# Patient Record
Sex: FEMALE | Race: WHITE | NOT HISPANIC OR LATINO | URBAN - METROPOLITAN AREA
[De-identification: names, ages, dates, MRNs, and addresses within clinical notes are randomized per-mention and may not be internally consistent; named-entity substitution may affect disease eponyms.]

---

## 2020-08-06 ENCOUNTER — LAB OUTSIDE AN ENCOUNTER (OUTPATIENT)
Dept: URBAN - METROPOLITAN AREA CLINIC 70 | Facility: CLINIC | Age: 50
End: 2020-08-06

## 2020-08-06 ENCOUNTER — OFFICE VISIT (OUTPATIENT)
Dept: URBAN - METROPOLITAN AREA CLINIC 70 | Facility: CLINIC | Age: 50
End: 2020-08-06
Payer: COMMERCIAL

## 2020-08-06 ENCOUNTER — WEB ENCOUNTER (OUTPATIENT)
Dept: URBAN - METROPOLITAN AREA CLINIC 70 | Facility: CLINIC | Age: 50
End: 2020-08-06

## 2020-08-06 DIAGNOSIS — R15.2 FECAL URGENCY: ICD-10-CM

## 2020-08-06 DIAGNOSIS — R11.0 NAUSEA: ICD-10-CM

## 2020-08-06 DIAGNOSIS — K21.0 REFLUX ESOPHAGITIS: ICD-10-CM

## 2020-08-06 DIAGNOSIS — R19.7 DIARRHEA, UNSPECIFIED TYPE: ICD-10-CM

## 2020-08-06 PROCEDURE — 99204 OFFICE O/P NEW MOD 45 MIN: CPT | Performed by: NURSE PRACTITIONER

## 2020-08-06 PROCEDURE — 1036F TOBACCO NON-USER: CPT | Performed by: NURSE PRACTITIONER

## 2020-08-06 PROCEDURE — G8417 CALC BMI ABV UP PARAM F/U: HCPCS | Performed by: NURSE PRACTITIONER

## 2020-08-06 PROCEDURE — G8427 DOCREV CUR MEDS BY ELIG CLIN: HCPCS | Performed by: NURSE PRACTITIONER

## 2020-08-06 PROCEDURE — 87507 IADNA-DNA/RNA PROBE TQ 12-25: CPT | Performed by: NURSE PRACTITIONER

## 2020-08-06 RX ORDER — VILAZODONE HYDROCHLORIDE 20 MG/1
TAKE 2 TABLETS (40 MG) BY ORAL ROUTE ONCE DAILY WITH FOOD TABLET ORAL 1
Qty: 0 | Refills: 0 | Status: DISCONTINUED | COMMUNITY
Start: 1900-01-01

## 2020-08-06 RX ORDER — PANTOPRAZOLE SODIUM 40 MG/1
1 TABLET TABLET, DELAYED RELEASE ORAL
Qty: 90 | Refills: 1 | OUTPATIENT

## 2020-08-06 RX ORDER — COLESEVELAM HYDROCHLORIDE 625 MG/1
TAKE 1 TABLET BY ORAL ROUTE 2 TIMES A DAY AS NEEDED TABLET, FILM COATED ORAL 2
Qty: 60 | Refills: 11 | Status: DISCONTINUED | COMMUNITY
Start: 2016-02-17

## 2020-08-06 NOTE — HPI-TODAY'S VISIT:
Currently c/o increased diarrhea over the last 3 months.  Also reports nausea x 1 year (intermittently).  Stools are loose to watery with urgency.  Denies signs of GI blood loss, abx within last 6 weeks, nocturnal diarrhea or nocturnal diarrhea.  Defecation occurs up 5 times a day but she does feel emptied of stool.  She underwent colonoscopy 09/2013 with 2 nonspecific ileal ulcers noted (benign).  Random bx were obtained due to c/o diarrhea at that time with negative results.   If she misses a dose of prilosec voices onset of sharp, digging pain to epigastric pain.  Denies NSAIDs or breakthrough symptoms of GERD.  EGD performed in 2014:  reflux esophagitis and gastritis (neg HP).

## 2020-08-12 LAB
A/G RATIO: 1.6
ADENOVIRUS F 40/41: NOT DETECTED
ALBUMIN: 4.4
ALKALINE PHOSPHATASE: 149
ALT (SGPT): 40
AST (SGOT): 36
ASTROVIRUS: NOT DETECTED
BASO (ABSOLUTE): 0.1
BASOS: 1
BILIRUBIN, TOTAL: <0.2
BUN/CREATININE RATIO: 8
BUN: 7
C DIFFICILE TOXIN A/B: NOT DETECTED
C-REACTIVE PROTEIN, QUANT: 4
CALCIUM: 9.9
CAMPYLOBACTER: NOT DETECTED
CARBON DIOXIDE, TOTAL: 22
CHLORIDE: 104
CREATININE: 0.88
CRYPTOSPORIDIUM: NOT DETECTED
CYCLOSPORA CAYETANENSIS: NOT DETECTED
DEAMIDATED GLIADIN ABS, IGA: 5
DEAMIDATED GLIADIN ABS, IGG: 3
E COLI O157: (no result)
EGFR IF AFRICN AM: 90
EGFR IF NONAFRICN AM: 78
ENTAMOEBA HISTOLYTICA: NOT DETECTED
ENTEROAGGREGATIVE E COLI: NOT DETECTED
ENTEROPATHOGENIC E COLI: NOT DETECTED
ENTEROTOXIGENIC E COLI: NOT DETECTED
EOS (ABSOLUTE): 0.3
EOS: 3
GIARDIA LAMBLIA: NOT DETECTED
GLOBULIN, TOTAL: 2.8
GLUCOSE: 80
HEMATOCRIT: 36.7
HEMATOLOGY COMMENTS:: (no result)
HEMOGLOBIN: 12.4
IMMATURE CELLS: (no result)
IMMATURE GRANS (ABS): 0
IMMATURE GRANULOCYTES: 0
IMMUNOGLOBULIN A, QN, SERUM: 261
LIPASE: 17
LYMPHS (ABSOLUTE): 2.9
LYMPHS: 32
MCH: 30.2
MCHC: 33.8
MCV: 89
MONOCYTES(ABSOLUTE): 0.9
MONOCYTES: 10
NEUTROPHILS (ABSOLUTE): 5
NEUTROPHILS: 54
NOROVIRUS GI/GII: NOT DETECTED
NRBC: (no result)
PLATELETS: 346
PLESIOMONAS SHIGELLOIDES: NOT DETECTED
POTASSIUM: 4.8
PROTEIN, TOTAL: 7.2
RBC: 4.11
RDW: 13.3
ROTAVIRUS A: NOT DETECTED
SALMONELLA: NOT DETECTED
SAPOVIRUS: NOT DETECTED
SEDIMENTATION RATE-WESTERGREN: 30
SHIGA-TOXIN-PRODUCING E COLI: NOT DETECTED
SHIGELLA/ENTEROINVASIVE E COLI: NOT DETECTED
SODIUM: 143
T-TRANSGLUTAMINASE (TTG) IGA: <2
T-TRANSGLUTAMINASE (TTG) IGG: 6
VIBRIO CHOLERAE: NOT DETECTED
VIBRIO: NOT DETECTED
WBC: 9.1
YERSINIA ENTEROCOLITICA: NOT DETECTED

## 2020-08-17 ENCOUNTER — TELEPHONE ENCOUNTER (OUTPATIENT)
Dept: URBAN - METROPOLITAN AREA CLINIC 70 | Facility: CLINIC | Age: 50
End: 2020-08-17

## 2020-08-17 ENCOUNTER — LAB OUTSIDE AN ENCOUNTER (OUTPATIENT)
Dept: URBAN - METROPOLITAN AREA CLINIC 70 | Facility: CLINIC | Age: 50
End: 2020-08-17

## 2020-08-20 ENCOUNTER — OFFICE VISIT (OUTPATIENT)
Dept: URBAN - METROPOLITAN AREA SURGERY CENTER 24 | Facility: SURGERY CENTER | Age: 50
End: 2020-08-20
Payer: COMMERCIAL

## 2020-08-20 DIAGNOSIS — K31.89 ACQUIRED DEFORMITY OF DUODENUM: ICD-10-CM

## 2020-08-20 DIAGNOSIS — K21.0 BILE REFLUX ESOPHAGITIS: ICD-10-CM

## 2020-08-20 DIAGNOSIS — D12.6 ADENOMA OF COLON: ICD-10-CM

## 2020-08-20 DIAGNOSIS — K63.89 BACTERIAL OVERGROWTH SYNDROME: ICD-10-CM

## 2020-08-20 DIAGNOSIS — R19.7 ACUTE DIARRHEA: ICD-10-CM

## 2020-08-20 PROCEDURE — 43239 EGD BIOPSY SINGLE/MULTIPLE: CPT | Performed by: INTERNAL MEDICINE

## 2020-08-20 PROCEDURE — 45380 COLONOSCOPY AND BIOPSY: CPT | Performed by: INTERNAL MEDICINE

## 2020-08-20 PROCEDURE — G8907 PT DOC NO EVENTS ON DISCHARG: HCPCS | Performed by: INTERNAL MEDICINE

## 2020-08-20 PROCEDURE — G9937 DIG OR SURV COLSCO: HCPCS | Performed by: INTERNAL MEDICINE

## 2020-08-24 ENCOUNTER — LAB OUTSIDE AN ENCOUNTER (OUTPATIENT)
Dept: URBAN - METROPOLITAN AREA CLINIC 70 | Facility: CLINIC | Age: 50
End: 2020-08-24

## 2020-09-08 LAB
ACTIN (SMOOTH MUSCLE) ANTIBODY: 6
ALPHA-1-ANTITRYPSIN, SERUM: 99
ANA DIRECT: NEGATIVE
COMMENT:: (no result)
FERRITIN, SERUM: 11
HBSAG SCREEN: NEGATIVE
HCV AB: 0.2
HEP B SURFACE AB, QUAL: NON REACTIVE
IRON BIND.CAP.(TIBC): 428
IRON SATURATION: 17
IRON: 72
MITOCHONDRIAL (M2) ANTIBODY: <20
PHENOTYPE (PI): (no result)
UIBC: 356

## 2021-02-10 ENCOUNTER — ERX REFILL RESPONSE (OUTPATIENT)
Dept: URBAN - METROPOLITAN AREA CLINIC 70 | Facility: CLINIC | Age: 51
End: 2021-02-10

## 2021-02-10 RX ORDER — PANTOPRAZOLE SODIUM 40 MG/1
1 TABLET TABLET, DELAYED RELEASE ORAL
Qty: 90 | Refills: 1

## 2023-07-21 ENCOUNTER — DASHBOARD ENCOUNTERS (OUTPATIENT)
Age: 53
End: 2023-07-21

## 2023-07-24 ENCOUNTER — OFFICE VISIT (OUTPATIENT)
Dept: URBAN - METROPOLITAN AREA CLINIC 70 | Facility: CLINIC | Age: 53
End: 2023-07-24

## 2023-07-24 RX ORDER — PANTOPRAZOLE SODIUM 40 MG/1
1 TABLET TABLET, DELAYED RELEASE ORAL
Qty: 90 | Refills: 1 | Status: DISCONTINUED | COMMUNITY